# Patient Record
Sex: MALE | Race: OTHER | NOT HISPANIC OR LATINO | Employment: FULL TIME | URBAN - METROPOLITAN AREA
[De-identification: names, ages, dates, MRNs, and addresses within clinical notes are randomized per-mention and may not be internally consistent; named-entity substitution may affect disease eponyms.]

---

## 2018-10-28 ENCOUNTER — HOSPITAL ENCOUNTER (EMERGENCY)
Facility: HOSPITAL | Age: 58
Discharge: HOME | End: 2018-10-28
Attending: EMERGENCY MEDICINE
Payer: OTHER MISCELLANEOUS

## 2018-10-28 VITALS
RESPIRATION RATE: 16 BRPM | WEIGHT: 193 LBS | DIASTOLIC BLOOD PRESSURE: 85 MMHG | HEART RATE: 83 BPM | OXYGEN SATURATION: 97 % | TEMPERATURE: 96.5 F | SYSTOLIC BLOOD PRESSURE: 159 MMHG

## 2018-10-28 DIAGNOSIS — Z23 RABIES, NEED FOR PROPHYLACTIC VACCINATION AGAINST: ICD-10-CM
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
## 2018-10-28 PROCEDURE — 96372 THER/PROPH/DIAG INJ SC/IM: CPT

## 2018-10-28 PROCEDURE — 99283 EMERGENCY DEPT VISIT LOW MDM: CPT

## 2018-10-28 PROCEDURE — 90376 RABIES IG HEAT TREATED: CPT

## 2018-10-28 PROCEDURE — 90675 RABIES VACCINE IM: CPT | Performed by: PHYSICIAN ASSISTANT

## 2018-10-28 PROCEDURE — 90472 IMMUNIZATION ADMIN EACH ADD: CPT

## 2018-10-28 PROCEDURE — 90715 TDAP VACCINE 7 YRS/> IM: CPT | Performed by: PHYSICIAN ASSISTANT

## 2018-10-28 PROCEDURE — 90471 IMMUNIZATION ADMIN: CPT

## 2018-10-28 RX ORDER — AMOXICILLIN AND CLAVULANATE POTASSIUM 875; 125 MG/1; MG/1
1 TABLET, FILM COATED ORAL EVERY 12 HOURS
Qty: 14 TABLET | Refills: 0 | Status: SHIPPED | OUTPATIENT
Start: 2018-10-28 | End: 2018-11-04

## 2018-10-28 RX ADMIN — TETANUS TOXOID, REDUCED DIPHTHERIA TOXOID AND ACELLULAR PERTUSSIS VACCINE, ADSORBED 0.5 ML: 5; 2.5; 8; 8; 2.5 SUSPENSION INTRAMUSCULAR at 09:49

## 2018-10-28 RX ADMIN — RABIES IMMUNE GLOBULIN (HUMAN) 1755 UNITS: 300 INJECTION, SOLUTION INFILTRATION; INTRAMUSCULAR at 09:48

## 2018-10-28 RX ADMIN — Medication 1 ML: at 09:48

## 2018-10-28 NOTE — DISCHARGE INSTRUCTIONS
You need to return on 10/31, 11/4 and 11/11 for repeat rabies vaccinations    Rabies   WHAT YOU NEED TO KNOW:   Rabies is a disease that affects the body's central nervous system (brain, spinal cord, and nerves)  Rabies is caused by a virus  You may get the virus if you come into contact with the saliva or other tissue of an infected animal  Rabies infection usually happens through a bite wound  Animals that may spread rabies include dogs, cats, coyotes, raccoons, foxes, skunks, and bats  Rabies develops when the virus enters the skin and goes to the muscles or nerves  DISCHARGE INSTRUCTIONS:   Call 911 for any of the following:   · You have trouble swallowing or slurred speech  · You have double vision, or you see things that are not really there  · You begin twitching, have muscle cramps, or have a seizure  Seek care immediately if:   · You think you were exposed to rabies  · You were bitten by an animal      · You feel weak, tired, dizzy, confused, restless, or anxious  Contact your healthcare provider if:   · You have a fever  · Your signs and symptoms do not get better after treatment  · You have questions or concerns about rabies or rabies treatment  Medicines:   · Medicines  such as the rabies vaccine or immune globulin may be given  These medicines help your body fight the virus and prevent rabies  · Take your medicine as directed  Contact your healthcare provider if you think your medicine is not helping or if you have side effects  Tell him or her if you are allergic to any medicine  Keep a list of the medicines, vitamins, and herbs you take  Include the amounts, and when and why you take them  Bring the list or the pill bottles to follow-up visits  Carry your medicine list with you in case of an emergency  Follow up with your healthcare provider as directed:  Write down your questions so you remember to ask them during your visits    Prevent rabies:   · Ask your healthcare provider about the rabies vaccine  You may need the vaccine if your work puts you at risk for rabies  You may also need the vaccine if you plan to travel to places where the risk for rabies is high  Ask for more information on rabies shots  · Avoid contact with animals  Do not approach any tame or wild animal that you do not know  Do not try to take them home with you  Cover windows and other openings in your home with screens so wild animals cannot get inside  · Get medical care if you get bitten by an animal   Do this even if the wound is very small  · Get your pet vaccinated against rabies  You will need to do this every 3 years or as directed by your   What to do if an animal bites you:  Clean the bite wound well and cover the wound with a clean bandage  Then contact your healthcare provider  © 2017 [employer] [employer] Information is for End User's use only and may not be sold, redistributed or otherwise used for commercial purposes  All illustrations and images included in CareNotes® are the copyrighted property of [employer]  or Ramesh Rogers  The above information is an  only  It is not intended as medical advice for individual conditions or treatments  Talk to your doctor, nurse or pharmacist before following any medical regimen to see if it is safe and effective for you

## 2018-10-28 NOTE — ED PROVIDER NOTES
History  Chief Complaint   Patient presents with   [address] [place] [address]     that he was at work and the [employer] were in an empty dumpster so he reached in to get it out and it bite him -  bite ronny noted to rt, thumb     63 yo M presenting after being bitten by a raccoon occurring just prior to arrival   Patient reports he was putting the trash out at work today when he noticed there were two raccoon's in the [facility]  Patient states that he reached into the dumpster to lift the raccoons out as he was afraid 'they would die in there' when one bit him on his right thumb  He reports wrapping the thumb and presenting to the ED  He denies any numbness, tingling or weakness  He is unsure of tetanus status  None       History reviewed  No pertinent past medical history  History reviewed  No pertinent surgical history  History reviewed  No pertinent family history  I have reviewed and agree with the history as documented  Social History   Substance Use Topics    Smoking status: Current Every Day Smoker     Packs/day: 1 00    Smokeless tobacco: Never Used    Alcohol use Yes        Review of Systems   All other systems reviewed and are negative        Physical Exam  Physical Exam   Musculoskeletal:        Hands:      Vital Signs  ED Triage Vitals [10/28/18 0925]   Temperature Pulse Respirations Blood Pressure SpO2   (!) 96 5 °F (35 8 °C) 83 16 159/85 97 %      Temp Source Heart Rate Source Patient Position - Orthostatic VS BP Location FiO2 (%)   Tympanic Monitor Sitting Right arm --      Pain Score       --           Vitals:    10/28/18 0925   BP: 159/85   Pulse: 83   Patient Position - Orthostatic VS: Sitting       Visual Acuity      ED Medications  Medications   rabies immune globulin, human (IMOGAM RABIES-HT) IM injection 1,755 Units (not administered)   tetanus-diphtheria-acellular pertussis (BOOSTRIX) IM injection 0 5 mL (0 5 mL Intramuscular Given 10/28/18 0949)   rabies vaccine, human diploid (IMOVAX RABIES) IM injection 1 mL (1 mL Intramuscular Given 10/28/18 0948)   rabies immune globulin, human (HyperRAB) 300 UNIT/ML injection **ADS Override Pull** (1,755 Units  Given 10/28/18 0948)       Diagnostic Studies  Results Reviewed     None                 No orders to display              Procedures  Procedures       Phone Contacts  ED Phone Contact    ED Course                               MDM  Number of Diagnoses or Management Options  Diagnosis management comments: 55-year-old male presenting after being bitten by a raccoon, patient given tetanus rabies vaccine and rabies immunoglobulin today, long conversation with patient regarding follow-up for vaccination series, he will need to follow-up on 10/31, 11/4 and 11/11 to complete the series, will start patient on Augmentin, otherwise patient has no complaints    strict return to ED precautions discussed  Pt verbalizes understanding and agrees with plan  Pt is stable for discharge    Portions of the record may have been created with voice recognition software  Occasional wrong word or "sound a like" substitutions may have occurred due to the inherent limitations of voice recognition software  Read the chart carefully and recognize, using context, where substitutions have occurred  CritCare Time    Disposition  Final diagnoses:   Raccoon bite, initial encounter   Rabies, need for prophylactic vaccination against     Time reflects when diagnosis was documented in both MDM as applicable and the Disposition within this note     Time User Action Codes Description Comment    10/28/2018 10:00 AM Leyla Urias Liyah Araiza bite, initial encounter     10/28/2018 10:01 AM Leyla SHAVER Add [Z23] Rabies, need for prophylactic vaccination against       ED Disposition     ED Disposition Condition Comment    Discharge  [address] discharge to home/self care      Condition at discharge: Good        Follow-up Information     Follow up With Specialties Details Why [facility] Emergency Department Emergency Medicine In 3 days (10/31) [address] [id number]  [facility] Emergency Department Emergency Medicine In 7 days (11/4) [address] [address]  [facility] Department Emergency Medicine In 14 days (11/11) [address] [address] [phone]          Patient's Medications   Discharge Prescriptions    AMOXICILLIN-CLAVULANATE (AUGMENTIN) 875-125 MG PER TABLET    Take 1 tablet by mouth every 12 (twelve) hours for 7 days       Start Date: 10/28/2018End Date: 11/4/2018       Order Dose: 1 tablet       Quantity: 14 tablet    Refills: 0     No discharge procedures on file      ED Provider  Electronically Signed by           Yisel Worthy PA-C  10/28/18 1002